# Patient Record
Sex: MALE | Race: AMERICAN INDIAN OR ALASKA NATIVE
[De-identification: names, ages, dates, MRNs, and addresses within clinical notes are randomized per-mention and may not be internally consistent; named-entity substitution may affect disease eponyms.]

---

## 2018-01-21 NOTE — EMERGENCY DEPARTMENT REPORT
Minor Respiratory





- HPI


Chief Complaint: Upper Respiratory Infection


Stated Complaint: cough and body aches - day 2


Time Seen by Provider: 01/21/18 19:24


Duration: 2 Days


Pain Location: Throat, Chest (cough), Other (tickle in throat when  he lies 

down triggering cough)


Severity: mild


Minor Respiratory: Yes Able to Tolerate Fluids, Yes Cough, Yes Fever (low grade)

, No Rhinorrhea, No Sore Throat, No Ear Pain, No Sick Contacts, No Hemoptysis, 

No Chest Pain, No Shortness of Breath


Other History: non toxic.  ambulatory.  taking po





ED Review of Systems


ROS: 


Stated complaint: FLU LIKE SX


Other details as noted in HPI





Comment: All other systems reviewed and negative


Constitutional: see HPI, malaise


Eyes: as per HPI


ENT: as per HPI


Respiratory: see HPI, cough


Cardiovascular: as per HPI.  denies: chest pain


Endocrine: no symptoms reported





ED Past Medical Hx





- Past Medical History


Previous Medical History?: No





- Surgical History


Past Surgical History?: No





- Social History


Smoking Status: Current Every Day Smoker


Substance Use Type: Marijuana





- Medications


Home Medications: 


 Home Medications











 Medication  Instructions  Recorded  Confirmed  Last Taken  Type


 


Amoxicillin 500 mg PO BID #20 capsule 01/21/18  Unknown Rx


 


Benzonatate [Tessalon Perles] 100 mg PO Q8HR PRN #20 capsule 01/21/18  Unknown 

Rx


 


Fluticasone [Flonase] 1 spray NS QDAY #1 bottle 01/21/18  Unknown Rx


 


predniSONE [Deltasone] 20 mg PO DAILY #5 tablet 01/21/18  Unknown Rx














Minor Respiratory Exam





- Exam


General: 


Vital signs noted. No distress. Alert and acting appropriately.





HEENT: Yes Pharyngeal Erythema, Yes Moist Mucous Membranes, No Pharyngeal 

Exudates, No Rhinorrhea, No Conjuctival Injection, No Frontal Tenderness, No 

Maxillary Tenderness


Ear: Neither TM Bulge, Neither TM Erythema, Neither EAC Pain, Neither EAC 

Discharge


Neck: Yes Supple, No Adenopathy


Lungs: Yes Good Air Exchange, Yes Cough, No Wheezes, No Ronchi, No Stridor, No 

Labored Respirations, No Retractions, No Use of Accessory Muscles, No Other 

Abnormal Lung Sounds


Heart: Yes Regular, No Murmur


Abdomen: Yes Normal Bowel Sounds, No Tenderness, No Peritoneal Signs


Skin: No Rash, No Edema


Neurologic: 


Alert and oriented, no deficits.








Musculoskeletal: 


Unremarkable.











ED Course


 Vital Signs











  01/21/18





  15:59


 


Temperature 99.7 F H


 


Pulse Rate 92 H


 


Respiratory 16





Rate 


 


Blood Pressure 116/67


 


O2 Sat by Pulse 98





Oximetry 














- Reevaluation(s)


Reevaluation #1: 





01/21/18 19:28


Patient presents to the ER with a 2 day history of cough.  He tells me the 

symptoms have been for 1 day he has told triage that it's been for a week.  

Patient asking for a work note.  He also complains of bodyaches per day.  He 

has a low-grade fever 99.7.  He is nontoxic and non-ill appearing.  Sitting in 

a chair reading a book.  Taking by mouth fluids.





No allergies no home medicines no major medical problems





Assessment is essentially unremarkable except for a cough that is raspy on 

consultation and a temperature of 99.7 orally.  Will treat as viral upper 

respiratory illness given flu swabs have been negative.  Patient not presenting 

as a typical influenza we've noted in the emergency room the season.  

Antibiotic prescription given with instructions when to start.  Follow-up 

instructions as well as return to ER instructions have been given.





Discharge home ambulatory in no acute distress





ED Medical Decision Making





- Medical Decision Making





see note





- Differential Diagnosis


ro influenza


Critical care attestation.: 


If time is entered above; I have spent that time in minutes in the direct care 

of this critically ill patient, excluding procedure time.








ED Disposition


Clinical Impression: 


 Upper respiratory infection, Viral respiratory illness, Cough





Disposition: DC-01 TO HOME OR SELFCARE


Is pt being admited?: No


Does the pt Need Aspirin: No


Condition: Stable


Instructions:  Viral Syndrome (ED), Cold Symptoms (ED)


Additional Instructions: 


Rest





Hydrate well with fluids





Medications as we discussed today.  Her flu swab was negative.  Treat sure low-

grade fever with Motrin and/or Tylenol.  Over-the-counter sign and symptom 

relief as we discussed.





Antibiotic prescription has been provided but only started after 24-48 hours if 

her symptoms are not improving or if they're getting worse.





Follow-up with primary care doctor in 48 hours to ensure your responding to 

treatment.





Tessalon Perles can be used for your cough





He turned to the ER if you develop a fever greater than 100.5 but does not come 

down with Motrin or Tylenol.  Or if he develops shortness of breath or chest 

pain.











Prescriptions: 


Amoxicillin 500 mg PO BID #20 capsule


Benzonatate [Tessalon Perles] 100 mg PO Q8HR PRN #20 capsule


 PRN Reason: Cough


Fluticasone [Flonase] 1 spray NS QDAY #1 bottle


predniSONE [Deltasone] 20 mg PO DAILY #5 tablet


Referrals: 


OH SCOTT MD [Primary Care Provider] - 3-5 Days


Forms:  Work/School Release Form(ED)


Time of Disposition: 19:26

## 2020-11-28 ENCOUNTER — HOSPITAL ENCOUNTER (EMERGENCY)
Dept: HOSPITAL 5 - ED | Age: 45
Discharge: LEFT BEFORE DISCHARGE | End: 2020-11-28
Payer: SELF-PAY

## 2020-11-28 VITALS — DIASTOLIC BLOOD PRESSURE: 85 MMHG | SYSTOLIC BLOOD PRESSURE: 148 MMHG

## 2020-11-28 DIAGNOSIS — L02.214: Primary | ICD-10-CM

## 2020-11-28 DIAGNOSIS — Z53.21: ICD-10-CM
